# Patient Record
Sex: FEMALE | Race: WHITE | NOT HISPANIC OR LATINO | URBAN - METROPOLITAN AREA
[De-identification: names, ages, dates, MRNs, and addresses within clinical notes are randomized per-mention and may not be internally consistent; named-entity substitution may affect disease eponyms.]

---

## 2024-07-27 NOTE — PROGRESS NOTES
Florinda Hahn female   1970 53 y.o.   [id number]           HPI  Florinda Hahn is a 53 y.o.  deaf (lip reads) female [facility].  right ultrasound guided biopsy indicating fibroadenoma. 2016 left stereotactic core biopsy for distortion noted atypical ductal hyperplasia (ADH) and atypical lobular hyperplasia (ALH). 2016 she had left excisional biopsy with additional ADH and ALH in a sclerosing lesion with flat epithelial atypia (FEA). She declines endocrine prevention therapy. She has family history of breast cancer in maternal grandmother.     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      BREAST [facility]2023 Screening mammogram, BI-RADS Category 0, left breast asymmetry.  2024 left diagnostic mammogram, BI-RADS Category 2, asymmetry consistent with stable postoperative changes stable since 2017.  No breast MRI performed due to cochlear implants.      REPRODUCTIVE HISTORY: Menarche age 12, , first birth 22, no breast-feeding, OCPs x 5 years, menopause age 51, 2 breast biopsies, one with atypia, extremely dense breast tissue     FAMILY CANCER SCREENING HISTORY:   Mother: uterine cancer  Maternal grandmother: breast cancer (?age)  Paternal grandfather: colon cancer       REVIEW OF SYSTEMS    Constitutional:  Negative for appetite change, fatigue, fever and unexpected weight change.   HENT:  Negative for ear pain, hearing loss, nosebleeds, sore throat and trouble swallowing.    Eyes:  Negative for discharge, itching and visual disturbance.   Respiratory:  Negative for cough, chest tightness and shortness of breath.    Cardiovascular:  Negative for chest pain, palpitations and leg swelling.   Breast: as indicated in HPI  Gastrointestinal:  Negative for abdominal pain, constipation, diarrhea and  nausea.   Endocrine: Negative for cold intolerance and heat intolerance.   Genitourinary:  Negative for dysuria, frequency, hematuria, pelvic pain and vaginal bleeding.   Musculoskeletal:  Negative for arthralgias, back pain, gait problem, joint swelling and myalgias.   Skin:  Negative for color change and rash.   Allergic/Immunologic: Negative for environmental allergies and food allergies.   Neurological:  Negative for dizziness, tremors, speech difficulty, weakness, numbness and headaches.   Hematological:  Does not bruise/bleed easily.   Psychiatric/Behavioral:  Negative for agitation, dysphoric mood and sleep disturbance. The patient is not nervous/anxious.         MEDICATIONS  No current outpatient medications     ALLERGIES  Not on File     SOCIAL HISTORY    No family history on file.      Social History     Tobacco Use    Smoking status: Not on file    Smokeless tobacco: Not on file   Substance Use Topics    Alcohol use: Not on file        VITALS  There were no vitals filed for this visit.     PHYSICAL EXAM  Patient is alert and oriented x3, in a relaxed, appropriate mood. Her gait is steady and hand grasps are equal. Sclera clear. The breasts are full and nearly symmetrical. Left breast has well-healed partial circumareolar incision & inferior medial. The tissue of both breast is dense in the superior breast (left more than right) and softer below with no palpable abnormalities, discrete nodules or masses. The skin and nipples are essential normal. There is no cervical, supraclavicular, or axillary lymphadenopathy palpable. Heart rate and rhythm normal, S1 and S2 appreciated. The lungs are clear to auscultation bilaterally. Abdomen is soft, non tender, no hepatosplenomegaly.       Physical Exam  Chest:              IMAGING    Time was spent viewing digital images of the radiology testing with the patient. I explained the results in depth, along with suggested explanation for follow up recommendations based  on the testing results.      RISK PROFILE          ORDERS  No orders of the defined types were placed in this encounter.          ASSESSMENT/PLAN  No diagnosis found.     No follow-ups on file.    HIGH RISK PLAN  Yearly mammogram with digital breast tomosynthesis  Twice yearly clinical breast examinations  Monthly self breast examinations &/or regular self breast awareness  Vitamin D3 2000 IU/daily (over the counter) unless your PCP recommends you take a specific dose  Exercise 3-4 times per week for 45-60 minutes  Limit alcohol to 3-4 drinks per week if you are menopausal  Eat healthy low-fat diet with lots of vegetable & fruits  Risk models indicate personal risk of breast cancer in the next 5 years and lifetime (age 85-90):  Breast Cancer Risk Assessment Tool (Josee): 5-year risk 3% (average 1.4%), lifetime risk 20.4% (average 10.4%).   Poncho: 5-year risk 9% (average 1.4%), lifetime risk 44%, (average 8.6%)    She is eligible for endocrine therapy with Tamoxifen, and also Raloxifene or Aromatase inhibitor if/when menopausal. Endocrine therapy reduces lifetime risk of breast cancer by 50% when taken for 5 years. There is an alternative low dose Tamoxifen which can be taken for only 3 years.      Yamilka Wasserman, [facility]

## 2024-07-29 ENCOUNTER — APPOINTMENT (OUTPATIENT)
Dept: SURGICAL ONCOLOGY | Facility: CLINIC | Age: 54
End: 2024-07-29
Payer: MEDICARE

## 2024-07-29 ENCOUNTER — APPOINTMENT (OUTPATIENT)
Dept: RADIOLOGY | Facility: CLINIC | Age: 54
End: 2024-07-29
Payer: MEDICARE